# Patient Record
Sex: FEMALE | Race: WHITE | NOT HISPANIC OR LATINO | Employment: FULL TIME | URBAN - METROPOLITAN AREA
[De-identification: names, ages, dates, MRNs, and addresses within clinical notes are randomized per-mention and may not be internally consistent; named-entity substitution may affect disease eponyms.]

---

## 2021-03-03 NOTE — PROGRESS NOTES
[facility]  [address]  [place] [address]  [phone]    Name: Carlita Love  : 1985  Patient Care Team:  Kallie Mcduffie APRN as PCP - General (Family Medicine)    Chief Complaint   Patient presents with   • Obesity   • Nutrition Counseling   • Weight Loss        HPI:   Ms. Carlita Love is a 35 y.o. female presents for initiation of medically supervised weight loss. She has tried other methods/programs to lose weight including:  Lean and has not ever tried medication to assist with weight loss. Describes her weight gain as a slow and steady increase during and after pregnancy. She did have around a 20 lbs decrease with faster way to weight loss, but regained this and found the program not sustainable. She has experience with tracking with myfitness pal but has not used it recently. She tries to remain active but is not currently doing mindful exercise. She walks when she can, typically while kids are at sports programs and does an occasional home workout video.     Lifetime non-pregnant maximum weight: 180 lb  Weight 1 year ago: 180 lb  Weight 5 years ago: 160 lb  The following have contributed to weight gain: work full time, 2 teenagers, multiple jobs, building a home    The following seem to sabotage weight loss efforts:Lack of time for planning & self, comfort/stress eating, enjoyment of food and always hungry    Review of Systems:   Review of Systems   Gastrointestinal: Positive for diarrhea (r/t gull bladder being out, not intolerable).       No Known Allergies      Current Outpatient Medications:   •  multivitamin with minerals (Multivitamin Adults) tablet tablet, Take 1 tablet by mouth Daily., Disp: 30 tablet, Rfl: 2      Exercise:       Current FITT Score      Frequency   Intensity Time Strength Training   []   0 None  []   0 None  []   0 None  []   0 None    [x]   1 (1-2x/week) [x]   1 (light) []   1 (<10 min) [x]   1 (1x/week)   []   2  "(3-5x/week) []   2 (moderate) [x]   2 (10-20 min) []   2 (2x/week)   []   3 (daily)   []   3 (moderately hard)  []   4 (very hard) []   3 (20-30 min)  []   4 (>30 min) []   3 (3-4x/week)         Water: 64 oz/day  Alcohol: CAGE is negative   Other beverages:  coffee, diet soda    PHQ-9 Total Score:  5  VS   Vitals:    03/04/21 0812   BP: 122/62   BP Location: Left arm   Patient Position: Sitting   Cuff Size: Adult   Pulse: 111   Resp: 18   Temp: 98 °F (36.7 °C)   TempSrc: Temporal   SpO2: 98%   Weight: 82.8 kg (182 lb 8 oz)   Height: 157.5 cm (62\")       Start Weight/BMI: 182.5 lb / 33.4  Weight Loss Goal: 130 lb  %fat: 43.7 %  %water: 75.5 lb    Measurements (in inches)  Neck: 14.25  Chest: 41  Waist: 40.5  Hips: 42.5  Thighs: 48    Physical Exam  Vitals signs reviewed.   Constitutional:       Appearance: She is obese. She is not ill-appearing.   Neck:      Thyroid: No thyroid mass, thyromegaly or thyroid tenderness.   Cardiovascular:      Rate and Rhythm: Normal rate and regular rhythm.   Pulmonary:      Effort: Pulmonary effort is normal.      Breath sounds: Normal breath sounds.   Neurological:      Mental Status: She is alert.   Psychiatric:         Attention and Perception: Attention and perception normal.         Behavior: Behavior is cooperative.          ASSESSMENT/PLAN:   Patient viewed educational videos. Topics included obesity as a disease, nutritional education on food groups, exercise, and medications. Patient was instructed in adequate protein, controlled carb and controlled fat intake.   Patient received instructions on using the medicines as a tool in controlling their weight with nutritional and behavioral changes. Risks and benefits were discussed. I believe the potential benefits of medication helping to decrease weight outweighs the risks. Patient is to try nutritonal/behavioral changes only first.   Patient received our clinic education booklet.   Our patient consent form was reviewed including " potential risks of weight loss. We also reviewed our confidentiality and HIPPA statements. Patients current FITT score was reviewed along with current capability for exercise tolerance and a patient will work towards a FITT score of:     Frequency   Intensity Time Strength Training   []   0 None  []   0 None  []   0 None  []   0 None    []   1 (1-2x/week) []   1 (light) []   1 (<10 min) []   1 (1x/week)   [x]   2 (3-5x/week) [x]   2 (moderate) []   2 (10-20 min) [x]   2 (2x/week)   []   3 (daily)   []   3 (moderately hard)  []   4 (very hard) []   3 (20-30 min)  [x]   4 (>30 min) []   3 (3-4x/week)       Patient's past medical history was reviewed in detail and barriers to weight loss were identified and discussed. Past efforts at weight reduction on their own as well as under physician supervision were documented and discussed.  I advised patient to continue routine care with their Primary Care Provider.     Nutritional recommendations and goals were reviewed including Calories 1000 daily adjusted for exercise calories burnt, Protein 35% daily, Net carbs (total carb - fiber) of 30% per daily    Diagnoses and all orders for this visit:    1. Obesity, Class I, BMI 30-34.9 (Primary)  Assessment & Plan:  --Start a multivitamin  --#1 goal is tracking. Set up my fitness pal in office with macros set protein 35%, carbs 30%  --Labs drawn today      Orders:  -     CBC & Differential  -     Comprehensive Metabolic Panel  -     T3, Free  -     T4, Free  -     TSH  -     Vitamin D 25 Hydroxy  -     Lipid Panel  -     Insulin, Total  -     Hemoglobin A1c    2. Encounter for therapeutic drug monitoring  -     Cancel: Urine Drug Screen - Urine, Clean Catch; Future  -     Urine Drug Screen - Urine, Clean Catch; Future    Other orders  -     multivitamin with minerals (Multivitamin Adults) tablet tablet; Take 1 tablet by mouth Daily.  Dispense: 30 tablet; Refill: 2       Treatment Plan  Non-Weight Loss Goals: more energy  Initial  goal of 5-10% loss of beginning body weight  Goals:  1. Personal Goals: increased energy. Final goal 140lbs  2. Start changing nutrition to examples given to meet nutritional macronutrient individual ranges discussed. Titrate down 500 calories every 5 days as tolerated until range met. Titrate down 50g carbohydrates every 5 days as tolerated until range met. Inc exercise to the individualized FITT score discussed. Start to keep a food journal and bring into next visit in 2 weeks for review. Practice the behavioral modification technique of mindful eating. Take one MVI daily and 2000mg fish oil daily. Take other medications and supplements as directed.      Return in about 4 weeks (around 4/1/2021).      JAMES Davidson

## 2021-03-04 ENCOUNTER — OFFICE VISIT (OUTPATIENT)
Dept: BARIATRICS/WEIGHT MGMT | Facility: CLINIC | Age: 36
End: 2021-03-04

## 2021-03-04 VITALS
SYSTOLIC BLOOD PRESSURE: 122 MMHG | HEART RATE: 111 BPM | HEIGHT: 62 IN | BODY MASS INDEX: 33.58 KG/M2 | TEMPERATURE: 98 F | OXYGEN SATURATION: 98 % | WEIGHT: 182.5 LBS | DIASTOLIC BLOOD PRESSURE: 62 MMHG | RESPIRATION RATE: 18 BRPM

## 2021-03-04 DIAGNOSIS — Z51.81 ENCOUNTER FOR THERAPEUTIC DRUG MONITORING: ICD-10-CM

## 2021-03-04 DIAGNOSIS — E66.9 OBESITY, CLASS I, BMI 30-34.9: Primary | ICD-10-CM

## 2021-03-04 PROCEDURE — MEDWTNEWPT: Performed by: NURSE PRACTITIONER

## 2021-03-04 RX ORDER — MULTIPLE VITAMINS W/ MINERALS TAB 9MG-400MCG
1 TAB ORAL DAILY
Qty: 30 TABLET | Refills: 2
Start: 2021-03-04

## 2021-03-04 NOTE — ASSESSMENT & PLAN NOTE
--Start a multivitamin  --#1 goal is tracking. Set up my fitness pal in office with macros set protein 35%, carbs 30%  --Labs drawn today

## 2021-03-05 LAB
25(OH)D3+25(OH)D2 SERPL-MCNC: 21.3 NG/ML (ref 30–100)
ALBUMIN SERPL-MCNC: 4.2 G/DL (ref 3.5–5.2)
ALBUMIN/GLOB SERPL: 1.6 G/DL
ALP SERPL-CCNC: 76 U/L (ref 39–117)
ALT SERPL-CCNC: 24 U/L (ref 1–33)
AST SERPL-CCNC: 21 U/L (ref 1–32)
BASOPHILS # BLD AUTO: 0.08 10*3/MM3 (ref 0–0.2)
BASOPHILS NFR BLD AUTO: 0.9 % (ref 0–1.5)
BILIRUB SERPL-MCNC: 0.5 MG/DL (ref 0–1.2)
BUN SERPL-MCNC: 7 MG/DL (ref 6–20)
BUN/CREAT SERPL: 11.9 (ref 7–25)
CALCIUM SERPL-MCNC: 9.2 MG/DL (ref 8.6–10.5)
CHLORIDE SERPL-SCNC: 103 MMOL/L (ref 98–107)
CHOLEST SERPL-MCNC: 181 MG/DL (ref 0–200)
CO2 SERPL-SCNC: 23.1 MMOL/L (ref 22–29)
CREAT SERPL-MCNC: 0.59 MG/DL (ref 0.57–1)
EOSINOPHIL # BLD AUTO: 0.12 10*3/MM3 (ref 0–0.4)
EOSINOPHIL NFR BLD AUTO: 1.4 % (ref 0.3–6.2)
ERYTHROCYTE [DISTWIDTH] IN BLOOD BY AUTOMATED COUNT: 13.2 % (ref 12.3–15.4)
GLOBULIN SER CALC-MCNC: 2.7 GM/DL
GLUCOSE SERPL-MCNC: 82 MG/DL (ref 65–99)
HBA1C MFR BLD: 5 % (ref 4.8–5.6)
HCT VFR BLD AUTO: 44.6 % (ref 34–46.6)
HDLC SERPL-MCNC: 55 MG/DL (ref 40–60)
HGB BLD-MCNC: 15 G/DL (ref 12–15.9)
IMM GRANULOCYTES # BLD AUTO: 0.04 10*3/MM3 (ref 0–0.05)
IMM GRANULOCYTES NFR BLD AUTO: 0.5 % (ref 0–0.5)
INSULIN SERPL-ACNC: 24.4 UIU/ML (ref 2.6–24.9)
LDLC SERPL CALC-MCNC: 111 MG/DL (ref 0–100)
LYMPHOCYTES # BLD AUTO: 2.7 10*3/MM3 (ref 0.7–3.1)
LYMPHOCYTES NFR BLD AUTO: 30.7 % (ref 19.6–45.3)
MCH RBC QN AUTO: 30.1 PG (ref 26.6–33)
MCHC RBC AUTO-ENTMCNC: 33.6 G/DL (ref 31.5–35.7)
MCV RBC AUTO: 89.4 FL (ref 79–97)
MONOCYTES # BLD AUTO: 0.47 10*3/MM3 (ref 0.1–0.9)
MONOCYTES NFR BLD AUTO: 5.3 % (ref 5–12)
NEUTROPHILS # BLD AUTO: 5.38 10*3/MM3 (ref 1.7–7)
NEUTROPHILS NFR BLD AUTO: 61.2 % (ref 42.7–76)
NRBC BLD AUTO-RTO: 0 /100 WBC (ref 0–0.2)
PLATELET # BLD AUTO: 305 10*3/MM3 (ref 140–450)
POTASSIUM SERPL-SCNC: 4.6 MMOL/L (ref 3.5–5.2)
PROT SERPL-MCNC: 6.9 G/DL (ref 6–8.5)
RBC # BLD AUTO: 4.99 10*6/MM3 (ref 3.77–5.28)
SODIUM SERPL-SCNC: 139 MMOL/L (ref 136–145)
T3FREE SERPL-MCNC: 3.5 PG/ML (ref 2–4.4)
T4 FREE SERPL-MCNC: 1.12 NG/DL (ref 0.93–1.7)
TRIGL SERPL-MCNC: 81 MG/DL (ref 0–150)
TSH SERPL DL<=0.005 MIU/L-ACNC: 1.61 UIU/ML (ref 0.27–4.2)
VLDLC SERPL CALC-MCNC: 15 MG/DL (ref 5–40)
WBC # BLD AUTO: 8.79 10*3/MM3 (ref 3.4–10.8)

## 2021-03-17 PROBLEM — E55.9 VITAMIN D INSUFFICIENCY: Status: ACTIVE | Noted: 2021-03-17

## 2021-03-17 PROBLEM — E88.81 INSULIN RESISTANCE: Status: ACTIVE | Noted: 2021-03-17

## 2021-03-17 NOTE — PROGRESS NOTES
"[facility] [place] [facility]  [address]  [place] [address]      Name: Carlita Love  : 1985    Chief Complaint   Patient presents with   • Follow-up   • Nutrition Counseling   • Weight Loss        No Known Allergies    Vitals:    21 0841   BP: 118/64   BP Location: Left arm   Patient Position: Sitting   Cuff Size: Adult   Pulse: 91   Resp: 18   Temp: 97.8 °F (36.6 °C)   TempSrc: Temporal   SpO2: 98%   Weight: 82.8 kg (182 lb 8 oz)   Height: 157.5 cm (62\")       Recent Weight History:   Wt Readings from Last 6 Encounters:   21 82.8 kg (182 lb 8 oz)   21 82.8 kg (182 lb 8 oz)       Last office visit weight (at [facility] visit) : 182.5 lb  Change in weight since last visit: 0  Start Weight: 182.5 lb  Total Loss%: 0.00 %  Loss of BBW: 0    Body composition analysis completed and showed:  %body fat: 43.6 %  Total fat mass (in lbs): 79.5 lb  Fat free mass (in lbs): 103.0 lb  Total body water (in lb): 75.5 lb  BMR: 1577     Measurements (in inches)  Waist: 40.75    The patient is exercising with a FITT score of:    Frequency   Intensity Time Strength Training   []   0 None  []   0 None  []   0 None  [x]   0 None    [x]   1 (1-2x/week) [x]   1 (light) []   1 (<10 min) []   1 (1x/week)   []   2 (3-5x/week) []   2 (moderate) []   2 (10-20 min) []   2 (2x/week)   []   3 (daily)   []   3 (moderately hard)  []   4 (very hard) []   3 (20-30 min)  [x]   4 (>30 min) []   3 (3-4x/week)       [facility]      Office visit for Carlita Love, a 35 y.o. female, established patient with Class 1 obestiy for medical weight loss.The patient is using a food journal. Tracked around / past day. Staying within calorie goal. The patient is not checking weight regularly. Waling 30-45 min around 2-3 days week. The patient rates current efforts as 5 out of 10. Body mass index is 33.38 kg/m².    Review of Systems:  Review of Systems   Genitourinary:        Menstrual cycle disruption, " since covid vaccine.  has had vasectomy, cycle started la      All other systems reviewed and are negative.      Physical Exam:  Physical Exam  Vitals reviewed.   Constitutional:       Appearance: She is obese. She is not ill-appearing.   Neck:      Thyroid: No thyroid mass, thyromegaly or thyroid tenderness.   Neurological:      Mental Status: She is alert.   Psychiatric:         Attention and Perception: Attention and perception normal.         Behavior: Behavior is cooperative.          ASSESSMENT/PLAN:   Diagnoses and all orders for this visit:    1. Obesity, Class I, BMI 30-34.9 (Primary)  Assessment & Plan:  --Continue great effort on tracking.  This next month is to track every day.  --Has noticed a craving for sweets carbohydrates.  Goal of stopping that cycle.  --Start Saxenda today sample provided in office batch number  a 1 ,expiration 5/2022  --Goal of walking 3 days a week for min of 30 min.     Orders:  -     Liraglutide (SAXENDA) 18 MG/3ML injection pen; Inject 3 mg under the skin into the appropriate area as directed Daily for 30 days. Titrate up an instructed starting at 0.6mg daily  Dispense: 5 pen; Refill: 0  -     ondansetron ODT (Zofran ODT) 4 MG disintegrating tablet; Place 1 tablet on the tongue Every 8 (Eight) Hours As Needed for Nausea or Vomiting for up to 30 days.  Dispense: 30 tablet; Refill: 2    2. Vitamin D insufficiency  -     vitamin D (ERGOCALCIFEROL) 1.25 MG (28310 UT) capsule capsule; Take 1 tablet WEEKLY for 8 weeks. Upon completion, start DAILY vit D supplementation of 2000UT.  Dispense: 4 capsule; Refill: 1  -     cholecalciferol (Vitamin D) 25 MCG (1000 UT) tablet; Take 2 tablets by mouth Daily. Start at completion of 50,000 IU Rx  Dispense: 60 tablet; Refill: 11    3. Insulin resistance      I have instructed the patient to continue with pursuit of medical weight loss as a part of this program.  Continue nutritional focus and work towards new exercise FITT goal  of:     Frequency   Intensity Time Strength Training   []   0 None  []   0 None  []   0 None  []   0 None    []   1 (1-2x/week) []   1 (light) []   1 (<10 min) []   1 (1x/week)   [x]   2 (3-5x/week) [x]   2 (moderate) []   2 (10-20 min) [x]   2 (2x/week)   []   3 (daily)   []   3 (moderately hard)  []   4 (very hard) []   3 (20-30 min)  [x]   4 (>30 min) []   3 (3-4x/week)       The current plan for this month includes: adjust exercise as discussed, weight loss goal 4-6lbs this month, continue to work on lifestyle behavioral changes and continue nutrition focus    I spent 20 minutes face to face with patient and 20 minutes were spent counseling the patient on obesity and behavior change.     Plan of care reviewed with the patient at the conclusion of today's visit. We discussed the risks, benefits, and limitations of treatments. Continue medications and OTC supplements as discussed. Patient verbalizes understanding of and agreement with management plan.      Return in about 4 weeks (around 4/15/2021).      JAMES Davidson

## 2021-03-18 ENCOUNTER — OFFICE VISIT (OUTPATIENT)
Dept: BARIATRICS/WEIGHT MGMT | Facility: CLINIC | Age: 36
End: 2021-03-18

## 2021-03-18 VITALS
BODY MASS INDEX: 33.58 KG/M2 | WEIGHT: 182.5 LBS | SYSTOLIC BLOOD PRESSURE: 118 MMHG | OXYGEN SATURATION: 98 % | RESPIRATION RATE: 18 BRPM | DIASTOLIC BLOOD PRESSURE: 64 MMHG | HEIGHT: 62 IN | TEMPERATURE: 97.8 F | HEART RATE: 91 BPM

## 2021-03-18 DIAGNOSIS — E55.9 VITAMIN D INSUFFICIENCY: ICD-10-CM

## 2021-03-18 DIAGNOSIS — E66.9 OBESITY, CLASS I, BMI 30-34.9: Primary | ICD-10-CM

## 2021-03-18 DIAGNOSIS — E88.81 INSULIN RESISTANCE: ICD-10-CM

## 2021-03-18 PROCEDURE — MEDWTESTPT: Performed by: NURSE PRACTITIONER

## 2021-03-18 RX ORDER — ERGOCALCIFEROL 1.25 MG/1
CAPSULE ORAL
Qty: 4 CAPSULE | Refills: 1 | Status: SHIPPED | OUTPATIENT
Start: 2021-03-18

## 2021-03-18 RX ORDER — ONDANSETRON 4 MG/1
4 TABLET, ORALLY DISINTEGRATING ORAL EVERY 8 HOURS PRN
Qty: 30 TABLET | Refills: 2 | Status: SHIPPED | OUTPATIENT
Start: 2021-03-18 | End: 2021-04-17

## 2021-03-18 RX ORDER — MELATONIN
2000 DAILY
Qty: 60 TABLET | Refills: 11
Start: 2021-03-18 | End: 2022-03-18

## 2021-03-18 NOTE — ASSESSMENT & PLAN NOTE
--Continue great effort on tracking.  This next month is to track every day.  --Has noticed a craving for sweets carbohydrates.  Goal of stopping that cycle.  --Start Saxenda today sample provided in office batch number  a 1 ,expiration 5/2022  --Goal of walking 3 days a week for min of 30 min.

## 2021-04-15 ENCOUNTER — OFFICE VISIT (OUTPATIENT)
Dept: BARIATRICS/WEIGHT MGMT | Facility: CLINIC | Age: 36
End: 2021-04-15

## 2021-04-15 VITALS
TEMPERATURE: 97.8 F | HEIGHT: 62 IN | WEIGHT: 178 LBS | DIASTOLIC BLOOD PRESSURE: 62 MMHG | HEART RATE: 93 BPM | BODY MASS INDEX: 32.76 KG/M2 | SYSTOLIC BLOOD PRESSURE: 118 MMHG | OXYGEN SATURATION: 98 % | RESPIRATION RATE: 18 BRPM

## 2021-04-15 DIAGNOSIS — E55.9 VITAMIN D INSUFFICIENCY: ICD-10-CM

## 2021-04-15 DIAGNOSIS — E66.9 OBESITY, CLASS I, BMI 30-34.9: Primary | ICD-10-CM

## 2021-04-15 DIAGNOSIS — E88.81 INSULIN RESISTANCE: ICD-10-CM

## 2021-04-15 PROCEDURE — MEDWTESTPT: Performed by: NURSE PRACTITIONER

## 2021-04-15 RX ORDER — PEN NEEDLE, DIABETIC 31 GX5/16"
NEEDLE, DISPOSABLE MISCELLANEOUS
COMMUNITY
Start: 2021-03-24

## 2021-04-15 NOTE — PROGRESS NOTES
"[facility]  [address]  [place] [address]    Name: Carlita Love  : 1985    Chief Complaint   Patient presents with   • Follow-up   • Nutrition Counseling   • Weight Loss        No Known Allergies    Vitals:    04/15/21 0857   BP: 118/62   BP Location: Left arm   Patient Position: Sitting   Cuff Size: Adult   Pulse: 93   Resp: 18   Temp: 97.8 °F (36.6 °C)   TempSrc: Temporal   SpO2: 98%   Weight: 80.7 kg (178 lb)   Height: 157.5 cm (62\")       Recent Weight History:   Wt Readings from Last 6 Encounters:   04/15/21 80.7 kg (178 lb)   21 82.8 kg (182 lb 8 oz)   21 82.8 kg (182 lb 8 oz)       Last office visit weight (at [facility] visit) : 182.5 lb  Change in weight since last visit: -4.5 lb loss  Start Weight: 182.5 lb  Total Loss%: -2.47 %  Loss of BBW: -4.5 lb    Body composition analysis completed and showed:  %body fat: 44.0 %  Total fat mass (in lbs): 78.5 lb  Fat free mass (in lbs): 99.5 lb  Total body water (in lb): 73.0 lb  BMR: 1558     Measurements (in inches)  Waist: 39    The patient is exercising with a FITT score of:    Frequency   Intensity Time Strength Training   []   0 None  []   0 None  []   0 None  [x]   0 None    [x]   1 (1-2x/week) [x]   1 (light) []   1 (<10 min) []   1 (1x/week)   []   2 (3-5x/week) []   2 (moderate) []   2 (10-20 min) []   2 (2x/week)   []   3 (daily)   []   3 (moderately hard)  []   4 (very hard) []   3 (20-30 min)  [x]   4 (>30 min) []   3 (3-4x/week)       HPI      Office visit for Carlita Love, a 35 y.o. female, established patient with class 1 obesity, insulin resistance and vit d insufficiency, for medical weight loss. At last visit she started Saxenda and an 8 week  course of vit D. She currently is at 2.4 mg of Saxenda and has only had mild nausea, using zofran only once. She did notice that she felt tired for the first 2 weeks but that has resolved.      Hunger control has improved The " patient is not exercising, but is in the process of building her house and is physical activity. The patient is using a food journal: hti 30/30 days. Noticed higher carb and protein than her goals. She does tend to stay within her calorie goal. The patient is checking weight regularly. The patient rates current efforts as 6-7 out of 10. Body mass index is 32.56 kg/m².    Review of Systems:  Review of Systems   Constitutional: Positive for fatigue (resolved).   Gastrointestinal: Positive for nausea (mild resolved due to saxenda).   All other systems reviewed and are negative.      Physical Exam:  Physical Exam  Vitals reviewed.   Constitutional:       Appearance: She is obese. She is not ill-appearing.   Pulmonary:      Effort: Pulmonary effort is normal.   Neurological:      Mental Status: She is alert.   Psychiatric:         Attention and Perception: Attention and perception normal.         Behavior: Behavior is cooperative.          ASSESSMENT/PLAN:   Diagnoses and all orders for this visit:    1. Obesity, Class I, BMI 30-34.9 (Primary)  Assessment & Plan:  --Food journaling is still at #1 priority.  She did great last month by tracking 30 out of 30 days.  Asking her to bring her food journal in next month for review again.  She has noticed that she is a little high on her carb macro goal and a little low on her protein macro goal  --Keep up great effort on water goal of 100 ounces  --Continue Saxenda and titrate up appropriately.  Currently at 2.  4 mg.  Rx sent to community pharmacy    Orders:  -     Liraglutide (SAXENDA) 18 MG/3ML injection pen; Inject 3 mg under the skin into the appropriate area as directed Daily for 30 days. Titrate up an instructed starting at 0.6mg daily  Dispense: 5 pen; Refill: 0    2. Insulin resistance    3. Vitamin D insufficiency      I have instructed the patient to continue with pursuit of medical weight loss as a part of this program. Patient does meet criteria for use of  anorectics at this time as BMI >27. Continue nutritional focus and work towards new exercise FITT goal of:     Frequency   Intensity Time Strength Training   []   0 None  []   0 None  []   0 None  []   0 None    []   1 (1-2x/week) []   1 (light) []   1 (<10 min) []   1 (1x/week)   [x]   2 (3-5x/week) [x]   2 (moderate) []   2 (10-20 min) [x]   2 (2x/week)   []   3 (daily)   []   3 (moderately hard)  []   4 (very hard) []   3 (20-30 min)  [x]   4 (>30 min) []   3 (3-4x/week)       The current plan for this month includes: adjust exercise as discussed, add resistance training, weight loss goal 4-6lbs this month, continue to work on lifestyle behavioral changes and continue nutrition focus    I spent 20 minutes face to face with patient and 20 minutes were spent counseling the patient on obesity and behavior change.     Plan of care reviewed with the patient at the conclusion of today's visit. We discussed the risks, benefits, and limitations of treatments. Continue medications and OTC supplements as discussed. Patient verbalizes understanding of and agreement with management plan.      Return in about 4 weeks (around 5/13/2021).      JAMES Davidson

## 2021-04-15 NOTE — ASSESSMENT & PLAN NOTE
--Food journaling is still at #1 priority.  She did great last month by tracking 30 out of 30 days.  Asking her to bring her food journal in next month for review again.  She has noticed that she is a little high on her carb macro goal and a little low on her protein macro goal  --Keep up great effort on water goal of 100 ounces  --Continue Saxenda and titrate up appropriately.  Currently at 2.  4 mg.  Rx sent to community pharmacy